# Patient Record
Sex: MALE | Race: WHITE | NOT HISPANIC OR LATINO | Employment: UNEMPLOYED | URBAN - METROPOLITAN AREA
[De-identification: names, ages, dates, MRNs, and addresses within clinical notes are randomized per-mention and may not be internally consistent; named-entity substitution may affect disease eponyms.]

---

## 2019-11-04 ENCOUNTER — TELEPHONE (OUTPATIENT)
Dept: SCHEDULING | Facility: IMAGING CENTER | Age: 64
End: 2019-11-04

## 2019-11-11 RX ORDER — TRAMADOL HYDROCHLORIDE 50 MG/1
TABLET ORAL
Refills: 1 | COMMUNITY
Start: 2019-10-17 | End: 2023-11-08

## 2019-11-15 ENCOUNTER — OFFICE VISIT (OUTPATIENT)
Dept: MEDICAL GROUP | Age: 64
End: 2019-11-15
Payer: MEDICARE

## 2019-11-15 VITALS
SYSTOLIC BLOOD PRESSURE: 110 MMHG | WEIGHT: 149.6 LBS | HEART RATE: 87 BPM | OXYGEN SATURATION: 96 % | DIASTOLIC BLOOD PRESSURE: 60 MMHG | HEIGHT: 69 IN | TEMPERATURE: 98.6 F | BODY MASS INDEX: 22.16 KG/M2

## 2019-11-15 DIAGNOSIS — M54.12 CERVICAL RADICULAR PAIN: ICD-10-CM

## 2019-11-15 DIAGNOSIS — Z98.890 S/P CERVICAL DISCECTOMY: ICD-10-CM

## 2019-11-15 DIAGNOSIS — Z23 NEED FOR VACCINATION: ICD-10-CM

## 2019-11-15 DIAGNOSIS — I10 ESSENTIAL HYPERTENSION: ICD-10-CM

## 2019-11-15 DIAGNOSIS — Z79.891 CHRONIC USE OF OPIATE DRUG FOR THERAPEUTIC PURPOSE: ICD-10-CM

## 2019-11-15 DIAGNOSIS — M54.16 LUMBAR RADICULOPATHY: ICD-10-CM

## 2019-11-15 DIAGNOSIS — Z76.89 ESTABLISHING CARE WITH NEW DOCTOR, ENCOUNTER FOR: ICD-10-CM

## 2019-11-15 PROCEDURE — 90471 IMMUNIZATION ADMIN: CPT | Performed by: PHYSICIAN ASSISTANT

## 2019-11-15 PROCEDURE — 99204 OFFICE O/P NEW MOD 45 MIN: CPT | Mod: 25 | Performed by: PHYSICIAN ASSISTANT

## 2019-11-15 PROCEDURE — 90715 TDAP VACCINE 7 YRS/> IM: CPT | Performed by: PHYSICIAN ASSISTANT

## 2019-11-15 RX ORDER — AMLODIPINE BESYLATE 5 MG/1
5 TABLET ORAL DAILY
COMMUNITY
End: 2019-11-15 | Stop reason: SDUPTHER

## 2019-11-15 RX ORDER — GABAPENTIN 100 MG/1
100 CAPSULE ORAL EVERY 12 HOURS
Qty: 180 CAP | Refills: 0 | Status: SHIPPED | OUTPATIENT
Start: 2019-11-15 | End: 2020-02-13

## 2019-11-15 RX ORDER — AMLODIPINE BESYLATE 5 MG/1
5 TABLET ORAL DAILY
Qty: 90 TAB | Refills: 3 | Status: SHIPPED | OUTPATIENT
Start: 2019-11-15 | End: 2020-10-23

## 2019-11-15 RX ORDER — LOSARTAN POTASSIUM 100 MG/1
100 TABLET ORAL DAILY
Qty: 90 TAB | Refills: 3 | Status: SHIPPED | OUTPATIENT
Start: 2019-11-15 | End: 2020-11-23

## 2019-11-15 RX ORDER — LOSARTAN POTASSIUM 100 MG/1
100 TABLET ORAL DAILY
COMMUNITY
End: 2019-11-15 | Stop reason: SDUPTHER

## 2019-11-15 RX ORDER — GABAPENTIN 100 MG/1
100 CAPSULE ORAL EVERY 12 HOURS
COMMUNITY
End: 2019-11-15 | Stop reason: SDUPTHER

## 2019-11-15 SDOH — HEALTH STABILITY: MENTAL HEALTH: HOW OFTEN DO YOU HAVE A DRINK CONTAINING ALCOHOL?: MONTHLY OR LESS

## 2019-11-15 SDOH — HEALTH STABILITY: MENTAL HEALTH: HOW MANY STANDARD DRINKS CONTAINING ALCOHOL DO YOU HAVE ON A TYPICAL DAY?: 1 OR 2

## 2019-11-15 ASSESSMENT — PATIENT HEALTH QUESTIONNAIRE - PHQ9: CLINICAL INTERPRETATION OF PHQ2 SCORE: 0

## 2019-11-15 NOTE — PROGRESS NOTES
cc: Establish care and medication refill    Subjective:     NICKOLAS Allred is a 64 y.o. male presenting to [facility].  He moved to the area few months ago from [place].  He is retired and is helping take care of his grandson-and plans to help take care of his second grandchild which is due in about 5 months.  He will be switching insurances at the end of the year, and is unsure if his new insurance will be accepted here at this clinic.  He is in need of medication refills.    Essential hypertension  He has been well controlled on 5 mg of amlodipine and 100 mg of losartan.  He does not monitor his blood pressures.  He does need a refill today.  Denies dizziness, chest pain, palpitations, shortness of breath, lower extremity edema.    Cervical radicular pain  Per patient he had cervical laminectomy in approximately 2010.  He still continues to have moderate cervical pain, with left-sided radiculopathy.  He was previously followed by neurosurgery and physical therapy.  Was discharged, as this was a Workmen's Comp. case.  He does take 100 mg of gabapentin twice daily, which does help with the radiculopathy.  He also is taking 50 mg of tramadol.  States it is prescribed up to 4 times a day, rarely does he take it 4 times a day, usually it is 2-3 times a day.  This also does help with his cervical pain.  He also has continued arthritis from previous shoulder surgeries which does help relieve his pain.  He does not need a refill today.    Lumbar radiculopathy  Per patient he has a bulging disc at L5-S1.  At some point they were planning on surgery, unsure when this was.  He has had  2 epidural steroid injections, which she did get some good pain relief with.  He is unsure when his last steroid injection was.  Is been a few years.  He does continue to have lower back pain, with occasionally numbness and tingling.  No loss of bowel or bladder function      Review of systems:  See above.       Current Outpatient  "Medications:   •  gabapentin (NEURONTIN) 100 MG Cap, Take 1 Cap by mouth every 12 hours for 90 days., Disp: 180 Cap, Rfl: 0  •  losartan (COZAAR) 100 MG Tab, Take 1 Tab by mouth every day for 90 days., Disp: 90 Tab, Rfl: 3  •  amLODIPine (NORVASC) 5 MG Tab, Take 1 Tab by mouth every day for 90 days., Disp: 90 Tab, Rfl: 3  •  tramadol (ULTRAM) 50 MG Tab, TK 4 TS PO QD PRN FOR 30 DAYS, Disp: , Rfl: 1    Allergies, past medical history, past surgical history, family history, social history reviewed and updated    Objective:     Vitals: /60 (BP Location: Left arm, Patient Position: Sitting, BP Cuff Size: Adult)   Pulse 87   Temp 37 °C (98.6 °F) (Temporal)   Ht 1.753 m (5' 9\")   Wt 67.9 kg (149 lb 9.6 oz)   SpO2 96%   BMI 22.09 kg/m²   General: Alert, pleasant, NAD  HEENT: Normocephalic. Neck supple.  No thyromegaly or masses palpated. No cervical or supraclavicular lymphadenopathy. No carotid bruits   Heart: Regular rate and rhythm.  S1 and S2 normal.  No murmurs appreciated.  Respiratory: Normal respiratory effort.  Clear to auscultation bilaterally.  Skin: Warm, dry, no rashes.  Extremities: No leg edema.  Radial pulses 2+ symmetric  Psych:  Affect/mood is normal, judgement is good, memory is intact, grooming is appropriate.    Assessment/Plan:     Jim was seen today for establish care and medication refill.    Diagnoses and all orders for this visit:    Essential hypertension  -Controlled.  Continue current medications.  Advised intermittent BP checks.  -     losartan (COZAAR) 100 MG Tab; Take 1 Tab by mouth every day for 90 days.  -     amLODIPine (NORVASC) 5 MG Tab; Take 1 Tab by mouth every day for 90 days.    Cervical radicular pain  -Discussed with patient that I do not do chronic pain management.  He does not need a refill of his tramadol, however if he runs out of his prescription will bridge him until he is seen by a pain management.  Referral sent.  Continue with gabapentin  -     REFERRAL " TO PAIN CLINIC  -     gabapentin (NEURONTIN) 100 MG Cap; Take 1 Cap by mouth every 12 hours for 90 days.    S/P cervical discectomy  -We will request old records and review when received  -     REFERRAL TO PAIN CLINIC    Lumbar radiculopathy  -We will request old records.  May consider MRI or referral to neurosurgery pending last imaging studies.  Patient states that if his new insurance is accepted here and he will plan on following up and we can evaluate this further.  The gabapentin works well to help control his radiculopathy.  Medication sent to pharmacy.  -     REFERRAL TO PAIN CLINIC  -     gabapentin (NEURONTIN) 100 MG Cap; Take 1 Cap by mouth every 12 hours for 90 days.    Chronic use of opiate drug for therapeutic purpose  -     REFERRAL TO PAIN CLINIC    Need for vaccination  -Immunization given in clinic today.  -     Tdap Vaccine =>6YO IM    Establishing care with new doctor, encounter for  -Discussed regular lab work, however he declines at this time.  He states that if his new insurance is accepted here and he will follow-up and we will plan on ordering lab work and evaluating his chronic conditions further.  We will request old records and review when received    Patient was seen for 40 minutes face to face of which > 50% of appointment time was spent on counseling and coordination of care regarding the above.    Return in about 3 months (around 2/15/2020) for Medication Check.

## 2019-11-15 NOTE — ASSESSMENT & PLAN NOTE
Is been well controlled on 5 mg of amlodipine and 100 mg of losartan.  He does not monitor his blood pressures.  He does need a refill today.  Denies dizziness, chest pain, palpitations, shortness of breath, lower extremity edema.

## 2019-11-16 NOTE — ASSESSMENT & PLAN NOTE
Per patient he had cervical laminectomy in approximately 2010.  He still continues to have moderate cervical pain, with left-sided radiculopathy.  He was previously followed by neurosurgery and physical therapy.  Was discharged, as this was a Workmen's Comp. case.  He does take 100 mg of gabapentin twice daily, which does help with the radiculopathy.  He also is taking 50 mg of tramadol.  States it is prescribed up to 4 times a day, rarely does he take it 4 times a day, usually it is 2-3 times a day.  This also does help with his cervical pain.  He also has continued arthritis from previous shoulder surgeries which does help relieve his pain.  He does not need a refill today.

## 2019-11-16 NOTE — ASSESSMENT & PLAN NOTE
Per patient he has a bulging disc at L5-S1.  At some point they were planning on surgery, unsure when this was.  He has had  2 epidural steroid injections, which she did get some good pain relief with.  He is unsure when his last steroid injection was.  Is been a few years.  He does continue to have lower back pain, with occasionally numbness and tingling.  No loss of bowel or bladder function

## 2019-11-19 ENCOUNTER — TELEPHONE (OUTPATIENT)
Dept: MEDICAL GROUP | Age: 64
End: 2019-11-19

## 2019-11-19 DIAGNOSIS — M54.16 LUMBAR RADICULOPATHY: ICD-10-CM

## 2019-11-19 DIAGNOSIS — M54.12 CERVICAL RADICULAR PAIN: ICD-10-CM

## 2019-11-19 DIAGNOSIS — Z98.890 S/P CERVICAL DISCECTOMY: ICD-10-CM

## 2019-11-19 RX ORDER — LIDOCAINE 50 MG/G
1 PATCH TOPICAL EVERY 24 HOURS
Qty: 30 PATCH | Refills: 1 | Status: SHIPPED | OUTPATIENT
Start: 2019-11-19 | End: 2019-12-19

## 2019-11-19 NOTE — TELEPHONE ENCOUNTER
VOICEMAIL  1. Caller Name: Jmi Fallon                        Call Back Number: [phone] (home)       2. Message: Pt left vm stating that he forgot to mention during his office visit that he was also using lidocaine patches before and needs a refill. No medication on file. Please advise.    3. Patient approves office to leave a detailed voicemail/MyChart message: yes

## 2019-11-19 NOTE — TELEPHONE ENCOUNTER
Phone Number Called: [phone] (home)       Call outcome: spoke to patient regarding message below    Message: Spoke to pt and he stated that he uses it when he needs them on his back right hip shoulder and neck, pt wants a box of 30. Pt stated prior pcp gave him a box of 30 that will last around 5-6 weeks at a time, pt does not know dose on them. Please advise.

## 2019-11-19 NOTE — TELEPHONE ENCOUNTER
We can fill it for him.  But can we find out how many times a week he was using the lidocaine patches

## 2019-11-20 NOTE — TELEPHONE ENCOUNTER
Phone Number Called: [phone] (home)       Call outcome: spoke to patient regarding message below    Message: Spoke to pt and informed that medication was sent to the pharmacy on file.

## 2020-01-10 ENCOUNTER — TELEPHONE (OUTPATIENT)
Dept: MEDICAL GROUP | Age: 65
End: 2020-01-10

## 2020-01-11 NOTE — TELEPHONE ENCOUNTER
DOCUMENTATION OF PAR STATUS:    1. Name of Medication & Dose: lidocaine (LIDODERM) 5 % Patch     2. Name of Prescription Coverage Company & phone #: [place]    3. Date Prior Auth Submitted: 1-    4. What information was given to obtain insurance decision? Diagnosis codes, pt info, insurance    5. Prior Auth Status? Pending     Key: [id number]    6. Patient Notified: no

## 2020-01-13 NOTE — TELEPHONE ENCOUNTER
FINAL PRIOR AUTHORIZATION STATUS:    1.  Name of Medication & Dose: Lidoderm 5% patch     2. Prior Auth Status: Denied.  Reason: Denied due to inappropriate diagnoses.     3. Action Taken: Pharmacy Notified: yes Patient Notified: yes

## 2020-02-05 ENCOUNTER — TELEPHONE (OUTPATIENT)
Dept: MEDICAL GROUP | Age: 65
End: 2020-02-05

## 2020-02-05 NOTE — TELEPHONE ENCOUNTER
ESTABLISHED PATIENT PRE-VISIT PLANNING     Patient was NOT contacted to complete PVP.     Note: Patient will not be contacted if there is no indication to call.     1.  Reviewed notes from the last few office visits within the medical group: Yes    2.  If any orders were placed at last visit or intended to be done for this visit (i.e. 6 mos follow-up), do we have Results/Consult Notes?        •  Labs - Labs were not ordered at last office visit.   Note: If patient appointment is for lab review and patient did not complete labs, check with provider if OK to reschedule patient until labs completed.       •  Imaging - Imaging was not ordered at last office visit.       •  Referrals - Referral ordered, patient was seen and consult notes are in chart. Care Teams updated  YES.    3. Is this appointment scheduled as a Hospital Follow-Up? No    4.  Immunizations were updated in Epic using WebIZ?: Epic matches WebIZ       •  Web Iz Recommendations: PREVNAR (PCV13)  and SHINGRIX (Shingles)    5.  Patient is due for the following Health Maintenance Topics:   Health Maintenance Due   Topic Date Due   • URINE DRUG SCREEN  1955   • Annual Wellness Visit  1955   • HEPATITIS C SCREENING  1955   • COLONOSCOPY  03/23/2003   • IMM ZOSTER VACCINES (1 of 2) 03/23/2003   • IMM PNEUMOCOCCAL VACCINE: 65+ Years (1 of 2 - PCV13) 03/23/2018         6. Orders for overdue Health Maintenance topics pended in Pre-Charting? N\A    7.  AHA (MDX) form printed for Provider? YES    8.  Patient was NOT informed to arrive 15 min prior to their scheduled appointment and bring in their medication bottles.

## 2020-10-22 DIAGNOSIS — I10 ESSENTIAL HYPERTENSION: ICD-10-CM

## 2020-10-23 RX ORDER — AMLODIPINE BESYLATE 5 MG/1
TABLET ORAL
Qty: 90 TAB | Refills: 0 | Status: SHIPPED | OUTPATIENT
Start: 2020-10-23 | End: 2022-07-27 | Stop reason: SDUPTHER

## 2020-10-23 NOTE — TELEPHONE ENCOUNTER
Phone Number Called: [phone] (home)     Call outcome: Spoke to patient regarding message below.    Message: Called patient to schedule AWV. Patient was confused with who Nereyda is. I explained that she is his primary care physician. He asked if she was out recently on maternity leave. I confirmed she was. He said that he got a new primary care because it was hard to get his medication while she was on maternity leave. He is going to stick with his current PCP.

## 2020-11-21 DIAGNOSIS — I10 ESSENTIAL HYPERTENSION: ICD-10-CM

## 2020-11-23 RX ORDER — LOSARTAN POTASSIUM 100 MG/1
TABLET ORAL
Qty: 90 TAB | Refills: 0 | Status: SHIPPED | OUTPATIENT
Start: 2020-11-23 | End: 2022-07-27 | Stop reason: SDUPTHER

## 2020-11-23 NOTE — TELEPHONE ENCOUNTER
Phone Number Called: [phone] (home)       Call outcome: Spoke to patient regarding message below.    Message: Spoke to pt and he informed me that he no longer see's Nereyda for primary care and he does not want to get anything filled by us. He has a new pcp and does not know why we are still getting prescriptions sent to us.

## 2020-11-23 NOTE — TELEPHONE ENCOUNTER
Received request via: Pharmacy    Was the patient seen in the last year in this department? No  Last seen 11/15/2019    Does the patient have an active prescription (recently filled or refills available) for medication(s) requested? No

## 2020-12-28 ENCOUNTER — HOSPITAL ENCOUNTER (OUTPATIENT)
Dept: HOSPITAL 8 - CFH | Age: 65
Discharge: HOME | End: 2020-12-28
Payer: MEDICARE

## 2020-12-28 DIAGNOSIS — Z87.891: ICD-10-CM

## 2020-12-28 DIAGNOSIS — Z12.2: Primary | ICD-10-CM

## 2020-12-28 PROCEDURE — G0297 LDCT FOR LUNG CA SCREEN: HCPCS

## 2021-03-03 DIAGNOSIS — Z23 NEED FOR VACCINATION: ICD-10-CM

## 2022-01-21 ENCOUNTER — TELEPHONE (OUTPATIENT)
Dept: MEDICAL GROUP | Facility: CLINIC | Age: 67
End: 2022-01-21

## 2022-01-21 DIAGNOSIS — M72.2 PLANTAR FASCIITIS: ICD-10-CM

## 2022-01-22 NOTE — TELEPHONE ENCOUNTER
VOICEMAIL  1. Caller Name: Jim                        Call Back Number: [phone]    2. Message: Jim was told by Dustin to reach out to PCP for a prescription for orthotics for plantar fasciitis. He's wondering if he can get a prescription or does he need to make an appointment?    3. Patient approves office to leave a detailed voicemail/MyChart message: N\A

## 2022-06-24 ENCOUNTER — OFFICE VISIT (OUTPATIENT)
Dept: MEDICAL GROUP | Facility: CLINIC | Age: 67
End: 2022-06-24
Payer: OTHER MISCELLANEOUS

## 2022-06-24 VITALS
TEMPERATURE: 97.3 F | HEART RATE: 85 BPM | OXYGEN SATURATION: 94 % | SYSTOLIC BLOOD PRESSURE: 125 MMHG | RESPIRATION RATE: 16 BRPM | HEIGHT: 70 IN | DIASTOLIC BLOOD PRESSURE: 72 MMHG | BODY MASS INDEX: 20.19 KG/M2 | WEIGHT: 141 LBS

## 2022-06-24 DIAGNOSIS — K40.90 INGUINAL HERNIA WITHOUT OBSTRUCTION OR GANGRENE, RECURRENCE NOT SPECIFIED, UNSPECIFIED LATERALITY: ICD-10-CM

## 2022-06-24 PROCEDURE — 99213 OFFICE O/P EST LOW 20 MIN: CPT | Mod: GE | Performed by: STUDENT IN AN ORGANIZED HEALTH CARE EDUCATION/TRAINING PROGRAM

## 2022-06-24 NOTE — PROGRESS NOTES
[facility] MEDICINE     PATIENT ID:  NAME:  Jim Allred  MRN:               [id number]  YOB: 1955    Resident: Eamon Rousseau DO    CC:  inguineal pain      HPI: Jim Allred is a 67 y.o. male who presented with inguinal pain    Problem   Inguinal Hernia    - patient w/ left inguineal pain for several months  - patient w/ pain ongoing which is worsening  - patient notes he has a bulge and then it will periodically get stuck in place but he is able to reduce it  - pain is typically sharp            REVIEW OF SYSTEMS:   Ten systems reviewed and were negative except as noted in the HPI.                PROBLEM LIST  Patient Active Problem List   Diagnosis   • Chronic use of opiate drug for therapeutic purpose   • Cervical radicular pain   • S/P cervical discectomy   • Lumbar radiculopathy   • Need for vaccination   • Essential hypertension   • Inguinal hernia        PAST SURGICAL HISTORY:  Past Surgical History:   Procedure Laterality Date   • DUPUYTREN CONTRACTURE RELEASE Left 2016   • CERVICAL LAMINECTOMY POSTERIOR  2010   • ORIF, HIP     • SHOULDER ARTHROPLASTY TOTAL Left    • SHOULDER ARTHROSCOPY W/ SLAP / LABRAL REPAIR         FAMILY HISTORY:  Family History   Problem Relation Age of Onset   • No Known Problems Daughter        SOCIAL HISTORY:   Social History     Tobacco Use   • Smoking status: Former Smoker     Packs/day: 0.00   • Smokeless tobacco: Never Used   Substance Use Topics   • Alcohol use: Yes     Comment: beer       ALLERGIES:  Allergies   Allergen Reactions   • Penicillins        OUTPATIENT MEDICATIONS:    Current Outpatient Medications:   •  Elastic Bandages & Supports (PLANTAR FASCIITIS SUPPORT) Misc, 1 Units every day. Foot orthotic for plantar fasciitis, Disp: 1 Each, Rfl: 1  •  losartan (COZAAR) 100 MG Tab, TAKE 1 TABLET BY MOUTH EVERY DAY, Disp: 90 Tab, Rfl: 0  •  amLODIPine (NORVASC) 5 MG Tab, TAKE 1 TABLET BY MOUTH EVERY DAY, Disp: 90 Tab, Rfl: 0  •  tramadol (ULTRAM) 50  "MG Tab, TK 4 TS PO QD PRN FOR 30 DAYS, Disp: , Rfl: 1    PHYSICAL EXAM:  Vitals:    06/24/22 1121   BP: 125/72   BP Location: Right arm   Patient Position: Sitting   BP Cuff Size: Adult   Pulse: 85   Resp: 16   Temp: 36.3 °C (97.3 °F)   TempSrc: Temporal   SpO2: 94%   Weight: 64 kg (141 lb)   Height: 1.778 m (5' 10\")       General: Pt resting in NAD, cooperative   Skin:  Pink, warm and dry.  HEENT: NC/AT. EOMI.  Abdomen:  Abdomen is soft, nontender. Left inguinal canal: small bulge present w/ cough, small palable lump over inguinal ligament.  Extremities:  Full range of motion.  CNS:  Muscle tone is normal. No gross focal neurologic deficits      ASSESSMENT/PLAN:   67 y.o. male     Problem List Items Addressed This Visit     Inguinal hernia     - patient w/ left inguineal pain for several months  - patient w/ pain ongoing which is worsening  - patient notes he has a bulge and then it will periodically get stuck in place but he is able to reduce it  - pain is typically sharp       - small bulge left inguinal area   - possible right inguinal hernia as well  - referral to gen surgery   - instructed patient to go to the ED if he is unable to reduce the hernia           Relevant Orders    Referral to General Surgery          Eamon Rousseau, DO  PGY-3  [facility]   "

## 2022-06-24 NOTE — ASSESSMENT & PLAN NOTE
- patient w/ left inguineal pain for several months  - patient w/ pain ongoing which is worsening  - patient notes he has a bulge and then it will periodically get stuck in place but he is able to reduce it  - pain is typically sharp       - small bulge left inguinal area   - possible right inguinal hernia as well  - referral to gen surgery   - instructed patient to go to the ED if he is unable to reduce the hernia

## 2022-07-27 DIAGNOSIS — I10 ESSENTIAL HYPERTENSION: ICD-10-CM

## 2022-08-04 RX ORDER — LOSARTAN POTASSIUM 100 MG/1
100 TABLET ORAL
Qty: 90 TABLET | Refills: 3 | Status: SHIPPED | OUTPATIENT
Start: 2022-08-04 | End: 2023-08-16 | Stop reason: SDUPTHER

## 2022-08-04 RX ORDER — AMLODIPINE BESYLATE 5 MG/1
5 TABLET ORAL
Qty: 90 TABLET | Refills: 3 | Status: SHIPPED | OUTPATIENT
Start: 2022-08-04 | End: 2023-08-16 | Stop reason: SDUPTHER

## 2022-08-04 NOTE — TELEPHONE ENCOUNTER
Pt called again requesting refills below. Pt almost out of medication, Dr. Kraus has not yet seen the refill request. Could you please send in? Thank you!

## 2023-08-10 ENCOUNTER — OFFICE VISIT (OUTPATIENT)
Dept: MEDICAL GROUP | Facility: CLINIC | Age: 68
End: 2023-08-10
Payer: COMMERCIAL

## 2023-08-10 VITALS
DIASTOLIC BLOOD PRESSURE: 66 MMHG | SYSTOLIC BLOOD PRESSURE: 99 MMHG | TEMPERATURE: 98.2 F | WEIGHT: 144.44 LBS | HEIGHT: 70 IN | OXYGEN SATURATION: 95 % | BODY MASS INDEX: 20.68 KG/M2 | HEART RATE: 88 BPM

## 2023-08-10 DIAGNOSIS — I10 ESSENTIAL HYPERTENSION: ICD-10-CM

## 2023-08-10 DIAGNOSIS — M54.12 CERVICAL RADICULAR PAIN: ICD-10-CM

## 2023-08-10 PROCEDURE — 99213 OFFICE O/P EST LOW 20 MIN: CPT | Mod: GE

## 2023-08-10 PROCEDURE — 3074F SYST BP LT 130 MM HG: CPT

## 2023-08-10 PROCEDURE — 3078F DIAST BP <80 MM HG: CPT

## 2023-08-10 RX ORDER — TRAMADOL HYDROCHLORIDE 50 MG/1
TABLET ORAL
COMMUNITY
End: 2023-11-08

## 2023-08-10 RX ORDER — LIDOCAINE 50 MG/G
1 PATCH TOPICAL
Qty: 30 PATCH | Refills: 2 | Status: SHIPPED | OUTPATIENT
Start: 2023-08-10 | End: 2023-11-08 | Stop reason: SDUPTHER

## 2023-08-10 RX ORDER — TAMSULOSIN HYDROCHLORIDE 0.4 MG/1
0.4 CAPSULE ORAL
COMMUNITY

## 2023-08-10 RX ORDER — FINASTERIDE 5 MG/1
5 TABLET, FILM COATED ORAL DAILY
COMMUNITY

## 2023-08-10 RX ORDER — GABAPENTIN 100 MG/1
CAPSULE ORAL
COMMUNITY
Start: 2023-07-17 | End: 2023-11-08

## 2023-08-10 RX ORDER — LOSARTAN POTASSIUM 100 MG/1
TABLET ORAL
COMMUNITY
End: 2023-11-08

## 2023-08-10 RX ORDER — AMLODIPINE BESYLATE 5 MG/1
TABLET ORAL
COMMUNITY
End: 2023-11-08

## 2023-08-10 RX ORDER — BACLOFEN 10 MG/1
TABLET ORAL
COMMUNITY
Start: 2023-06-21 | End: 2023-11-08

## 2023-08-10 ASSESSMENT — PATIENT HEALTH QUESTIONNAIRE - PHQ9: CLINICAL INTERPRETATION OF PHQ2 SCORE: 0

## 2023-08-10 NOTE — PROGRESS NOTES
SUBJECTIVE:     CC:  to establish care    HISTORY OF THE PRESENT ILLNESS:   Patient is a 68 y.o. male, here today requesting his refill for lidocaine patches for his shoulder and lower back pain. Patient reports his pain was managed by his former provider, Dr. Jain, however, he and his insurance have been unable to reach Dr. Jain for refill. Patient states he has had neck surgery, two shoulder surgeries, bilaterally and [facility]. Patient also had a left inguinal hernia repair 2022.  Patient states he takes Gabapentin and baclofen daily. He uses the lidocaine patch as needed.    #Essential Hypertension:   His blood pressure is controlled. Patient take Amlodipine 5 mg and Losartan 100mg daily.     Patient Active Problem List   Diagnosis    Chronic use of opiate drug for therapeutic purpose    Cervical radicular pain    S/P cervical discectomy    Lumbar radiculopathy    Need for vaccination    Essential hypertension    Inguinal hernia     Past Surgical History:   Procedure Laterality Date    DUPUYTREN CONTRACTURE RELEASE Left 2016    CERVICAL LAMINECTOMY POSTERIOR  2010    ORIF, HIP      SHOULDER ARTHROPLASTY TOTAL Left     SHOULDER ARTHROSCOPY W/ SLAP / LABRAL REPAIR       Family History   Problem Relation Age of Onset    No Known Problems Daughter      Social History     Tobacco Use    Smoking status: Every Day     Packs/day: 0.00     Types: Cigarettes    Smokeless tobacco: Never   Vaping Use    Vaping Use: Never used   Substance Use Topics    Alcohol use: Yes     Comment: beer daily    Drug use: Never     Current Outpatient Medications on File Prior to Visit   Medication Sig Dispense Refill    baclofen (LIORESAL) 10 MG Tab       gabapentin (NEURONTIN) 100 MG Cap       finasteride (PROSCAR) 5 MG Tab Take 5 mg by mouth every day.      tamsulosin (FLOMAX) 0.4 MG capsule Take 0.4 mg by mouth 1/2 hour after breakfast.      amLODIPine (NORVASC) 5 MG Tab Take 1  "Tablet by mouth every day. 90 Tablet 3    losartan (COZAAR) 100 MG Tab Take 1 Tablet by mouth every day. 90 Tablet 3    tramadol (ULTRAM) 50 MG Tab TK 4 TS PO QD PRN FOR 30 DAYS  1    amLODIPine (NORVASC) 5 MG Tab AMLODIPINE BESYLATE 5 MG TABS (Patient not taking: Reported on 8/10/2023)      losartan (COZAAR) 100 MG Tab LOSARTAN POTASSIUM 100 MG TABS (Patient not taking: Reported on 8/10/2023)      traMADol (ULTRAM) 50 MG Tab TRAMADOL HCL 50 MG TABS (Patient not taking: Reported on 8/10/2023)      Elastic Bandages & Supports (PLANTAR FASCIITIS SUPPORT) Misc 1 Units every day. Foot orthotic for plantar fasciitis (Patient not taking: Reported on 8/10/2023) 1 Each 1     No current facility-administered medications on file prior to visit.       Allergies   Allergen Reactions    Penicillins        ROS:   Gen: no fevers/chills, no changes in weight  Eyes: no changes in vision  ENT: no changes in hearing  Pulm: no sob, no cough  CV: no chest pain, no palpitations  GI: no nausea/vomiting, no diarrhea  MSk: no myalgias  Skin: no rash  Neuro: no headaches, no numbness/tingling      OBJECTIVE:     Exam: BP 99/66 (BP Location: Left arm, Patient Position: Sitting)   Pulse 88   Temp 36.8 °C (98.2 °F) (Temporal)   Ht 1.778 m (5' 10\")   Wt 65.5 kg (144 lb 7 oz)   SpO2 95%  Body mass index is 20.72 kg/m².    General: Normal appearing. No distress.  HEENT: Normocephalic. Atraumatic.  Neck: Supple without JVD or bruit. Thyroid is not enlarged.  Pulmonary: Clear to ausculation.  Normal effort. No rales, ronchi, or wheezing.  Cardiovascular: Regular rate and rhythm without murmur. Carotid and radial pulses are intact and equal bilaterally.  Abdomen: Soft, nontender, nondistended. Normal bowel sounds. Liver and spleen are not palpable  Neurologic: Grossly nonfocal  Skin: Warm and dry.  No obvious lesions.  Musculoskeletal: Normal gait. No extremity cyanosis, clubbing, or edema.  Psych: Normal mood and affect. Alert and oriented x3. " Judgment and insight is normal.      ASSESSMENT & PLAN:   68 y.o. male with the following -    #Cervical radicular pain  Patient presents today requesting his refill for lidocaine patches for his shoulder and lower back pain. Patient's pain was managed by his former provider, Dr. Jain, however, he and his insurance have been unable to reach Dr. Jain for refill. Patient is s/p neck surgery, two shoulder surgeries, bilaterally and [facility]. Patient takes Gabapentin and baclofen daily. He uses the lidocaine patch as needed on his shoulders and lower for pain.    - Refill sent for Lidocaine 5% patch as needed for pain.  - Insurance form completed for lidocaine patch.    #Essential Hypertension   His blood pressure is controlled. Patient take Amlodipine 5 mg and Losartan 100mg daily.  - Continue Amlodipine 5mg daily  - Continue Losartan 100mg daily       Nicolas Moralez, PGY-2  [facility]

## 2023-08-16 DIAGNOSIS — I10 ESSENTIAL HYPERTENSION: ICD-10-CM

## 2023-08-17 RX ORDER — LOSARTAN POTASSIUM 100 MG/1
100 TABLET ORAL
Qty: 90 TABLET | Refills: 3 | Status: SHIPPED | OUTPATIENT
Start: 2023-08-17 | End: 2023-11-08 | Stop reason: SDUPTHER

## 2023-08-17 RX ORDER — AMLODIPINE BESYLATE 5 MG/1
5 TABLET ORAL
Qty: 90 TABLET | Refills: 3 | Status: SHIPPED | OUTPATIENT
Start: 2023-08-17 | End: 2023-11-08 | Stop reason: SDUPTHER

## 2023-09-06 ENCOUNTER — TELEPHONE (OUTPATIENT)
Dept: MEDICAL GROUP | Facility: OTHER | Age: 68
End: 2023-09-06

## 2023-09-06 DIAGNOSIS — M54.12 CERVICAL RADICULAR PAIN: ICD-10-CM

## 2023-09-06 NOTE — TELEPHONE ENCOUNTER
[phone]  5% lidocaine gel/ointment/cream [facility]  Patient denied lidocaine patch by insurance

## 2023-09-14 ENCOUNTER — TELEPHONE (OUTPATIENT)
Dept: MEDICAL GROUP | Facility: CLINIC | Age: 68
End: 2023-09-14

## 2023-09-14 NOTE — LETTER
September 27, 2023    Appeal Letter for Lidocaine 5% Patch under medicare Part D    To Whom It May Concern:    On behalf of my patient, Jim Allred, Member number [id number]. I will like to file an appeal for coverage of his Lidocaine 5% patch, which was denied on 9/6/203.    Mr Allred has a history of  chronic shoulder and lower back pain. His pain was controlled in the past with the above mentioned patch, however, his former provider was not reacheable for refills. Mr Allred is currently in my care. He has had neck surgery, two shoulder surgeries, bilaterally and [facility]. He takes Gabapentin and Baclofen daily. He uses the lidocaine patch as needed.     We have tried over the counter 1% Lidocaine patch, which has not provided relief.    Thank you for your consideration of this appeal.     If you have any questions please do not hesitate to call me at the phone number listed below.    Sincerely,          Yuliana Moralez M.D.  [phone]

## 2023-09-14 NOTE — TELEPHONE ENCOUNTER
Patient called requesting an appeal for the Lidocaine patch to be requested by you, I have the denial letter, please advise thank you

## 2023-10-13 ENCOUNTER — TELEPHONE (OUTPATIENT)
Dept: MEDICAL GROUP | Facility: CLINIC | Age: 68
End: 2023-10-13
Payer: COMMERCIAL

## 2023-10-14 NOTE — TELEPHONE ENCOUNTER
Patient called regarding Lidocaine patch prior auth denial, he would like to know what you advise as to what the next step is,please advise thank you

## 2023-10-16 NOTE — TELEPHONE ENCOUNTER
That appeal was denied Dr. Moralez, he is reaching out to see if there is any other advise on how to move forward with this

## 2023-11-08 ENCOUNTER — OFFICE VISIT (OUTPATIENT)
Dept: INTERNAL MEDICINE | Facility: OTHER | Age: 68
End: 2023-11-08
Payer: COMMERCIAL

## 2023-11-08 VITALS
WEIGHT: 149.8 LBS | DIASTOLIC BLOOD PRESSURE: 81 MMHG | HEART RATE: 94 BPM | HEIGHT: 70 IN | SYSTOLIC BLOOD PRESSURE: 134 MMHG | BODY MASS INDEX: 21.45 KG/M2 | OXYGEN SATURATION: 99 % | TEMPERATURE: 97.4 F

## 2023-11-08 DIAGNOSIS — R55 SYNCOPE AND COLLAPSE: ICD-10-CM

## 2023-11-08 DIAGNOSIS — R35.1 BENIGN PROSTATIC HYPERPLASIA WITH NOCTURIA: ICD-10-CM

## 2023-11-08 DIAGNOSIS — M25.511 PAIN OF BOTH SHOULDER JOINTS: ICD-10-CM

## 2023-11-08 DIAGNOSIS — Z96.641 PRESENCE OF RIGHT ARTIFICIAL HIP JOINT: ICD-10-CM

## 2023-11-08 DIAGNOSIS — M25.512 PAIN OF BOTH SHOULDER JOINTS: ICD-10-CM

## 2023-11-08 DIAGNOSIS — Z86.39 HISTORY OF IRON DEFICIENCY: ICD-10-CM

## 2023-11-08 DIAGNOSIS — N40.1 BENIGN PROSTATIC HYPERPLASIA WITH NOCTURIA: ICD-10-CM

## 2023-11-08 DIAGNOSIS — M54.50 CHRONIC BILATERAL LOW BACK PAIN WITHOUT SCIATICA: ICD-10-CM

## 2023-11-08 DIAGNOSIS — Z13.228 SCREENING FOR METABOLIC DISORDER: ICD-10-CM

## 2023-11-08 DIAGNOSIS — Z72.0 TOBACCO USE: ICD-10-CM

## 2023-11-08 DIAGNOSIS — M72.2 PLANTAR FASCIAL FIBROMATOSIS: ICD-10-CM

## 2023-11-08 DIAGNOSIS — I10 ESSENTIAL HYPERTENSION: ICD-10-CM

## 2023-11-08 DIAGNOSIS — G89.29 CHRONIC BILATERAL LOW BACK PAIN WITHOUT SCIATICA: ICD-10-CM

## 2023-11-08 DIAGNOSIS — Z98.890 S/P CERVICAL DISCECTOMY: ICD-10-CM

## 2023-11-08 DIAGNOSIS — H90.3 SENSORINEURAL HEARING LOSS, BILATERAL: ICD-10-CM

## 2023-11-08 PROBLEM — H40.013 OPEN ANGLE WITH BORDERLINE FINDINGS, LOW RISK, BILATERAL: Status: ACTIVE | Noted: 2023-11-08

## 2023-11-08 PROBLEM — M54.9 CHRONIC BACK PAIN: Status: ACTIVE | Noted: 2020-02-07

## 2023-11-08 PROBLEM — F41.9 ANXIETY DISORDER, UNSPECIFIED: Status: ACTIVE | Noted: 2023-11-08

## 2023-11-08 PROBLEM — L82.1 SEBORRHEIC KERATOSIS: Status: ACTIVE | Noted: 2023-11-08

## 2023-11-08 PROBLEM — K57.30 DIVERTICULOSIS OF LARGE INTESTINE WITHOUT PERFORATION OR ABSCESS WITHOUT BLEEDING: Status: ACTIVE | Noted: 2023-11-08

## 2023-11-08 PROBLEM — M75.101 ROTATOR CUFF TEAR, RIGHT: Status: ACTIVE | Noted: 2020-07-14

## 2023-11-08 PROBLEM — M81.0 OSTEOPOROSIS: Status: ACTIVE | Noted: 2020-11-16

## 2023-11-08 PROBLEM — K63.5 POLYP OF COLON: Status: ACTIVE | Noted: 2023-11-08

## 2023-11-08 PROBLEM — F17.200 SMOKES TOBACCO DAILY: Status: ACTIVE | Noted: 2020-11-16

## 2023-11-08 PROBLEM — R33.9 RETENTION OF URINE, UNSPECIFIED: Status: ACTIVE | Noted: 2023-11-08

## 2023-11-08 PROBLEM — M85.88 OTHER SPECIFIED DISORDERS OF BONE DENSITY AND STRUCTURE, OTHER SITE: Status: ACTIVE | Noted: 2020-11-19

## 2023-11-08 PROBLEM — Z96.649 PRESENCE OF HIP JOINT PROSTHESIS: Status: ACTIVE | Noted: 2023-11-08

## 2023-11-08 PROCEDURE — 3079F DIAST BP 80-89 MM HG: CPT | Performed by: INTERNAL MEDICINE

## 2023-11-08 PROCEDURE — 99204 OFFICE O/P NEW MOD 45 MIN: CPT | Performed by: INTERNAL MEDICINE

## 2023-11-08 PROCEDURE — 3075F SYST BP GE 130 - 139MM HG: CPT | Performed by: INTERNAL MEDICINE

## 2023-11-08 RX ORDER — LOSARTAN POTASSIUM 100 MG/1
100 TABLET ORAL
Qty: 90 TABLET | Refills: 2 | Status: SHIPPED | OUTPATIENT
Start: 2023-11-08

## 2023-11-08 RX ORDER — AMLODIPINE BESYLATE 5 MG/1
5 TABLET ORAL
Qty: 90 TABLET | Refills: 2 | Status: SHIPPED | OUTPATIENT
Start: 2023-11-08

## 2023-11-08 RX ORDER — LIDOCAINE 50 MG/G
1 PATCH TOPICAL
Qty: 90 PATCH | Refills: 2 | Status: SHIPPED | OUTPATIENT
Start: 2023-11-08 | End: 2023-12-20 | Stop reason: SDUPTHER

## 2023-11-08 NOTE — PROGRESS NOTES
CC:  New patient    HISTORY OF THE PRESENT ILLNESS: Patient is a 68 y.o. male who presents to hospitals care. He has a PMH significant for chronic pain, BPH, anxiety disorder, hypertension, plantar fascial fibromatosis.     Patient with h/o right hip osteoarthritis s/p arthroplasty 11/2022, [facility]  He had cervical laminectomy in 2014 secondary to cervical radiculopathy related to a Workers' Comp. Patient was part of the [facility]. Has also had bilateral shoulder surgeries.  He also reports chronic pain in the bilateral shoulder, lower back pain without sciatica. Has been on Baclofen, Tramadol and Gabapentin. However, weaned himself off medications about 1-2 months ago. He is now only using lidocaine patch which help.    Plantar fasciitis- [facility]. He has custom orthotics shoes.    Patient with BPH, [facility]. He is currently finasteride 5mg and tamulosin 0.8.mg. Reports that his symptoms are better controlled, he is only have to get up once a night to urinate.    Hypertension, diagnosed several years ago. He is on Losartan and amlodipine, tolerating medications well without side effects. Denies any chest pain, dizziness or light-headedness. He does endorse one episode of syncope about 1.5 months ago while weaning off medications. [facility] which [facility]. Had a Ziopatch as well, results unknown.     Health Maintenance:     Screening/Preventative Topics:  Diet: Well-balanced   Exercise: Daily.   Screen for depression: PHQ-2: 0  Substance Use: 1/2 ppd x 50 years, drinks a case of beer a week, no history of withdrawal. No illict drug use      Cancer screening  Colorectal Cancer Screening: Colonoscopy 4-5 months ago, had 7 polyps   Family history of colon cancer- father  Prostate Cancer Screening/PSA: yearly per [facility]    Allergies: Penicillins    Current Outpatient Medications Ordered in Epic   Medication Sig Dispense Refill     amLODIPine (NORVASC) 5 MG Tab Take 1 Tablet by mouth every day. 90 Tablet 2    losartan (COZAAR) 100 MG Tab Take 1 Tablet by mouth every day. 90 Tablet 2    lidocaine (LIDODERM) 5 % Patch Place 1 Patch on the skin 1 time a day as needed (as needed for shoulder and lower back). 90 Patch 2    finasteride (PROSCAR) 5 MG Tab Take 5 mg by mouth every day.      tamsulosin (FLOMAX) 0.4 MG capsule Take 0.4 mg by mouth 1/2 hour after breakfast.      Elastic Bandages & Supports (PLANTAR FASCIITIS SUPPORT) Misc 1 Units every day. Foot orthotic for plantar fasciitis (Patient not taking: Reported on 8/10/2023) 1 Each 1     No current [facility]-ordered facility-administered medications on file.       No past medical history on file.    Past Surgical History:   Procedure Laterality Date    DUPUYTREN CONTRACTURE RELEASE Left 2016    CERVICAL LAMINECTOMY POSTERIOR  2010    ORIF, HIP      SHOULDER ARTHROPLASTY TOTAL Left     SHOULDER ARTHROSCOPY W/ SLAP / LABRAL REPAIR         Social History     Tobacco Use    Smoking status: Every Day     Types: Cigarettes    Smokeless tobacco: Never   Vaping Use    Vaping Use: Never used   Substance Use Topics    Alcohol use: Yes     Comment: beer daily    Drug use: Never       Social History     Social History Narrative    Not on file       Family History   Problem Relation Age of Onset    No Known Problems Daughter        ROS:    Constitutional: Negative for fever, chills, weight loss and malaise/fatigue.   HENT: Negative for ear pain, nosebleeds, congestion, sore throat and neck pain.    Eyes: Negative for changes of vision  Respiratory: Negative for cough, sputum production, shortness of breath and wheezing.    Cardiovascular: Negative for chest pain, palpitations, orthopnea and leg swelling.   Gastrointestinal: Negative for heartburn, nausea, vomiting and abdominal pain.   Genitourinary: Negative for dysuria.   Skin: Negative for open sores  Neurological: Negative for dizziness, tingling,  "tremors, sensory change, focal weakness and headaches.   Endo/Heme/Allergies: Does not bruise/bleed easily.   Psychiatric/Behavioral: Negative for depression, suicidal ideas and memory loss.  All other systems reviewed and are negative except as in HPI.          Exam: /81 (BP Location: Left arm, Patient Position: Sitting, BP Cuff Size: Adult)   Pulse 94   Temp 36.3 °C (97.4 °F) (Temporal)   Ht 1.778 m (5' 10\")   Wt 67.9 kg (149 lb 12.8 oz)   SpO2 99%  Body mass index is 21.49 kg/m².    General: Normal appearing. No distress.  HEENT: Normocephalic. Eyes conjunctiva clear lids without ptosis, pupils equal and reactive to light accommodation, ears normal shape and contour, canals are clear bilaterally, tympanic membranes are benign, oropharynx is without erythema, edema or exudates.   Neck: Supple. Thyroid is not enlarged.  Pulmonary: Clear to ausculation.  Normal effort. No rales, ronchi, or wheezing.  Cardiovascular: Regular rate and rhythm without murmur. Radial pulses are intact and equal bilaterally.  Abdomen: Soft, nontender, nondistended. Normal bowel sounds.   Neurologic: Grossly nonfocal  Lymph: No cervical, supraclavicular or axillary lymph nodes are palpable  Skin: Warm and dry.  No obvious lesions.  Musculoskeletal: Normal gait. No extremity cyanosis, clubbing, or edema. Bilateral Dupuytren contracture of the 5 digits.     Assessment/Plan    Essential hypertension  Chronic, stable. Doing well on medication  Continue current medications.  - Comp Metabolic Panel; Future  - amLODIPine (NORVASC) 5 MG Tab; Take 1 Tablet by mouth every day.  Dispense: 90 Tablet; Refill: 2  - losartan (COZAAR) 100 MG Tab; Take 1 Tablet by mouth every day.  Dispense: 90 Tablet; Refill: 2    S/P cervical discectomy  Cervical radicular pain  Chronic lower back pain  Pain in joint of bilateral shoulder  Presence of right artificial hip joint   Has weaned himself of Baclofen, Gabapentin, Tramadol. Has done PT.  He is only " requiring Lidoderm patches for lower back and cervical radicular pain.  Continue lidoderm patches.  - lidocaine (LIDODERM) 5 % Patch; Place 1 Patch on the skin 1 time a day as needed (as needed for shoulder and lower back).  Dispense: 90 Patch; Refill: 2    History of iron deficiency  Per patient, has h/o iron deficiency anemia, secondary to diet  Was on iron supplements  Had colonoscopy about 5 months ago with 7 polyps. Plan for repeat in 1 year.   - CBC WITH DIFFERENTIAL; Future    Screening for metabolic disorder  - Lipid Profile; Future  - HEMOGLOBIN A1C; Future    Benign prostatic hyperplasia with nocturia  Chronic, stable. [facility]  Continue Flomax and Proscar      Plantar fascial fibromatosis  [facility], has custom orthotic shoes.    Syncope and collapse  One episode 1.5 months ago, per patient [facility].  Had Ziopatch, results unknown.  No further episodes.   Obtain records.     Sensorineural hearing loss, bilateral  Has bilateral hearing aids, recently right one is malfunctioning.  Pending repair vs replacement.    Tobacco use   1/2 ppd x 50 years, will discuss tobacco cessation at next visit     Healthcare maintenance  Colonoscopy completed 5 months, has history of polyps. Plans to repeat in 1 year.  PSA screening year by [facility]    Follow-up in 6 weeks.

## 2023-11-09 ENCOUNTER — TELEPHONE (OUTPATIENT)
Dept: INTERNAL MEDICINE | Facility: OTHER | Age: 68
End: 2023-11-09
Payer: COMMERCIAL

## 2023-11-09 DIAGNOSIS — M54.50 CHRONIC BILATERAL LOW BACK PAIN WITHOUT SCIATICA: ICD-10-CM

## 2023-11-09 DIAGNOSIS — M25.512 PAIN OF BOTH SHOULDER JOINTS: ICD-10-CM

## 2023-11-09 DIAGNOSIS — Z98.890 S/P CERVICAL DISCECTOMY: ICD-10-CM

## 2023-11-09 DIAGNOSIS — Z96.641 PRESENCE OF RIGHT ARTIFICIAL HIP JOINT: ICD-10-CM

## 2023-11-09 DIAGNOSIS — G89.29 CHRONIC BILATERAL LOW BACK PAIN WITHOUT SCIATICA: ICD-10-CM

## 2023-11-09 DIAGNOSIS — M25.511 PAIN OF BOTH SHOULDER JOINTS: ICD-10-CM

## 2023-11-28 ENCOUNTER — HOSPITAL ENCOUNTER (OUTPATIENT)
Dept: LAB | Facility: MEDICAL CENTER | Age: 68
End: 2023-11-28
Attending: INTERNAL MEDICINE
Payer: COMMERCIAL

## 2023-11-28 DIAGNOSIS — I10 ESSENTIAL HYPERTENSION: ICD-10-CM

## 2023-11-28 DIAGNOSIS — Z86.39 HISTORY OF IRON DEFICIENCY: ICD-10-CM

## 2023-11-28 DIAGNOSIS — Z13.228 SCREENING FOR METABOLIC DISORDER: ICD-10-CM

## 2023-11-28 LAB
ALBUMIN SERPL BCP-MCNC: 4.7 G/DL (ref 3.2–4.9)
ALBUMIN/GLOB SERPL: 2.5 G/DL
ALP SERPL-CCNC: 55 U/L (ref 30–99)
ALT SERPL-CCNC: 18 U/L (ref 2–50)
ANION GAP SERPL CALC-SCNC: 9 MMOL/L (ref 7–16)
AST SERPL-CCNC: 23 U/L (ref 12–45)
BASOPHILS # BLD AUTO: 0.5 % (ref 0–1.8)
BASOPHILS # BLD: 0.03 K/UL (ref 0–0.12)
BILIRUB SERPL-MCNC: 1.2 MG/DL (ref 0.1–1.5)
BUN SERPL-MCNC: 11 MG/DL (ref 8–22)
CALCIUM ALBUM COR SERPL-MCNC: 8.7 MG/DL (ref 8.5–10.5)
CALCIUM SERPL-MCNC: 9.3 MG/DL (ref 8.5–10.5)
CHLORIDE SERPL-SCNC: 102 MMOL/L (ref 96–112)
CHOLEST SERPL-MCNC: 189 MG/DL (ref 100–199)
CO2 SERPL-SCNC: 27 MMOL/L (ref 20–33)
CREAT SERPL-MCNC: 0.69 MG/DL (ref 0.5–1.4)
EOSINOPHIL # BLD AUTO: 0.09 K/UL (ref 0–0.51)
EOSINOPHIL NFR BLD: 1.5 % (ref 0–6.9)
ERYTHROCYTE [DISTWIDTH] IN BLOOD BY AUTOMATED COUNT: 49.5 FL (ref 35.9–50)
EST. AVERAGE GLUCOSE BLD GHB EST-MCNC: 103 MG/DL
GFR SERPLBLD CREATININE-BSD FMLA CKD-EPI: 99 ML/MIN/1.73 M 2
GLOBULIN SER CALC-MCNC: 1.9 G/DL (ref 1.9–3.5)
GLUCOSE SERPL-MCNC: 105 MG/DL (ref 65–99)
HBA1C MFR BLD: 5.2 % (ref 4–5.6)
HCT VFR BLD AUTO: 38.8 % (ref 42–52)
HDLC SERPL-MCNC: 100 MG/DL
HGB BLD-MCNC: 13.5 G/DL (ref 14–18)
IMM GRANULOCYTES # BLD AUTO: 0.02 K/UL (ref 0–0.11)
IMM GRANULOCYTES NFR BLD AUTO: 0.3 % (ref 0–0.9)
LDLC SERPL CALC-MCNC: 74 MG/DL
LYMPHOCYTES # BLD AUTO: 1.37 K/UL (ref 1–4.8)
LYMPHOCYTES NFR BLD: 22.6 % (ref 22–41)
MCH RBC QN AUTO: 35.5 PG (ref 27–33)
MCHC RBC AUTO-ENTMCNC: 34.8 G/DL (ref 32.3–36.5)
MCV RBC AUTO: 102.1 FL (ref 81.4–97.8)
MONOCYTES # BLD AUTO: 0.8 K/UL (ref 0–0.85)
MONOCYTES NFR BLD AUTO: 13.2 % (ref 0–13.4)
NEUTROPHILS # BLD AUTO: 3.76 K/UL (ref 1.82–7.42)
NEUTROPHILS NFR BLD: 61.9 % (ref 44–72)
NRBC # BLD AUTO: 0 K/UL
NRBC BLD-RTO: 0 /100 WBC (ref 0–0.2)
PLATELET # BLD AUTO: 216 K/UL (ref 164–446)
PMV BLD AUTO: 9.8 FL (ref 9–12.9)
POTASSIUM SERPL-SCNC: 5.2 MMOL/L (ref 3.6–5.5)
PROT SERPL-MCNC: 6.6 G/DL (ref 6–8.2)
RBC # BLD AUTO: 3.8 M/UL (ref 4.7–6.1)
SODIUM SERPL-SCNC: 138 MMOL/L (ref 135–145)
TRIGL SERPL-MCNC: 74 MG/DL (ref 0–149)
WBC # BLD AUTO: 6.1 K/UL (ref 4.8–10.8)

## 2023-11-28 PROCEDURE — 85025 COMPLETE CBC W/AUTO DIFF WBC: CPT

## 2023-11-28 PROCEDURE — 83036 HEMOGLOBIN GLYCOSYLATED A1C: CPT

## 2023-11-28 PROCEDURE — 80053 COMPREHEN METABOLIC PANEL: CPT

## 2023-11-28 PROCEDURE — 80061 LIPID PANEL: CPT

## 2023-11-28 PROCEDURE — 36415 COLL VENOUS BLD VENIPUNCTURE: CPT

## 2023-11-30 ENCOUNTER — TELEPHONE (OUTPATIENT)
Dept: INTERNAL MEDICINE | Facility: OTHER | Age: 68
End: 2023-11-30
Payer: COMMERCIAL

## 2023-12-01 NOTE — TELEPHONE ENCOUNTER
Caller Name: Jim Fallon  Call Back Number: [phone]    How would the patient prefer to be contacted with a response: N/A    Lisa placed a prior auth for pt's lidocaine patch on 11/9/23. PA was not followed up on and was denied. Pt has contacted the office multiple times but has not received a response. Pt is asking if we can attempt an appeal via peer to peer. Pt wanted me to specify that he had a right hip replacement amongst his other medical issues to emphasize his need for these patches. Please advise.

## 2023-12-01 NOTE — TELEPHONE ENCOUNTER
Phone Number Called: [phone]    Call outcome: Spoke to patient regarding message below.    Message: Pt was informed that Dr. Rangel will be doing a [id number] appeal for him and to  salonpas in the meantime. Pt would like a phone call for update once [id number] is completed.

## 2023-12-20 ENCOUNTER — OFFICE VISIT (OUTPATIENT)
Dept: INTERNAL MEDICINE | Facility: OTHER | Age: 68
End: 2023-12-20
Payer: COMMERCIAL

## 2023-12-20 VITALS
HEIGHT: 70 IN | TEMPERATURE: 97.3 F | HEART RATE: 101 BPM | DIASTOLIC BLOOD PRESSURE: 74 MMHG | BODY MASS INDEX: 21.47 KG/M2 | WEIGHT: 150 LBS | OXYGEN SATURATION: 98 % | SYSTOLIC BLOOD PRESSURE: 123 MMHG

## 2023-12-20 DIAGNOSIS — M54.12 CERVICAL RADICULAR PAIN: ICD-10-CM

## 2023-12-20 DIAGNOSIS — M25.512 PAIN OF BOTH SHOULDER JOINTS: ICD-10-CM

## 2023-12-20 DIAGNOSIS — D53.9 MACROCYTIC ANEMIA: ICD-10-CM

## 2023-12-20 DIAGNOSIS — B02.29 POST HERPETIC NEURALGIA: ICD-10-CM

## 2023-12-20 DIAGNOSIS — I10 ESSENTIAL HYPERTENSION: ICD-10-CM

## 2023-12-20 DIAGNOSIS — M54.16 LUMBAR RADICULOPATHY: ICD-10-CM

## 2023-12-20 DIAGNOSIS — M25.511 PAIN OF BOTH SHOULDER JOINTS: ICD-10-CM

## 2023-12-20 DIAGNOSIS — Z72.0 TOBACCO USE: ICD-10-CM

## 2023-12-20 DIAGNOSIS — I25.10 ASCVD (ARTERIOSCLEROTIC CARDIOVASCULAR DISEASE): ICD-10-CM

## 2023-12-20 PROCEDURE — 3074F SYST BP LT 130 MM HG: CPT | Performed by: INTERNAL MEDICINE

## 2023-12-20 PROCEDURE — 3078F DIAST BP <80 MM HG: CPT | Performed by: INTERNAL MEDICINE

## 2023-12-20 PROCEDURE — 99214 OFFICE O/P EST MOD 30 MIN: CPT | Performed by: INTERNAL MEDICINE

## 2023-12-20 RX ORDER — LIDOCAINE 50 MG/G
1 PATCH TOPICAL EVERY 24 HOURS
Qty: 90 PATCH | Refills: 3 | Status: SHIPPED | OUTPATIENT
Start: 2023-12-20

## 2023-12-20 RX ORDER — ROSUVASTATIN CALCIUM 5 MG/1
20 TABLET, COATED ORAL EVERY EVENING
Qty: 90 TABLET | Refills: 2 | Status: SHIPPED | OUTPATIENT
Start: 2023-12-20

## 2023-12-20 RX ORDER — LIDOCAINE 50 MG/G
1 PATCH TOPICAL EVERY 24 HOURS
Qty: 90 PATCH | Refills: 3 | Status: SHIPPED | OUTPATIENT
Start: 2023-12-20 | End: 2023-12-20

## 2023-12-20 ASSESSMENT — FIBROSIS 4 INDEX: FIB4 SCORE: 1.76

## 2023-12-20 NOTE — PROGRESS NOTES
12/20/2023  Chief Complaint   Patient presents with    Follow-Up       HISTORY OF PRESENT ILLNESS: Patient is a 68 y.o. male established patient who presents today for follow-up.    Patient with h/o right hip osteoarthritis s/p arthroplasty 11/2022, [facility]  He had cervical laminectomy in 2014 secondary to cervical radiculopathy related to a Workers' Comp. Patient was part of the [facility]. Has also had two surgeries on the left shoulder surgery with bicep tendesis.  He also reports chronic pain in the bilateral shoulder, lower back pain with occasional sharp shooting pains.  Has been on Baclofen, Tramadol and Gabapentin. However, weaned himself off medications. He takes Tylenol as needed for pain as needed. He continues to do home exercises for his pain. Remains very active.     He continues to take Losartan and Amlodipine for his blood pressure. He does not check his blood pressure at home. He denies any chest pain, shortness of breath, dizziness, light-headedness.     Patient also endorse a history of shingles that affected his right calf earlier this year, [facility]. He has residual discomfort and itchiness at the site.       Past Medical History:   Diagnosis Date    Anemia     BPH (benign prostatic hyperplasia)     Hypertension     Personal history of colonic polyps        Patient Active Problem List    Diagnosis Date Noted    Anxiety disorder, unspecified 11/08/2023    Benign prostatic hyperplasia with lower urinary tract symptoms 11/08/2023    Diverticulosis of large intestine without perforation or abscess without bleeding 11/08/2023    Open angle with borderline findings, low risk, bilateral 11/08/2023    Plantar fascial fibromatosis 11/08/2023    Polyp of colon 11/08/2023    Presence of right artificial hip joint 11/08/2023    Presence of hip joint prosthesis 11/08/2023    Retention of urine, unspecified 11/08/2023    Seborrheic keratosis 11/08/2023     Sensorineural hearing loss, bilateral 11/08/2023    Syncope and collapse 11/08/2023    Inguinal hernia 06/24/2022    Other specified disorders of bone density and structure, other site 11/19/2020    Osteoporosis 11/16/2020    Smokes tobacco daily 11/16/2020    Rotator cuff tear, right 07/14/2020    Chronic back pain 02/07/2020    Pain in joint of right shoulder 02/07/2020    Chronic use of opiate drug for therapeutic purpose 11/15/2019    Cervical radicular pain 11/15/2019    S/P cervical discectomy 11/15/2019    Lumbar radiculopathy 11/15/2019    Need for vaccination 11/15/2019    Essential hypertension 11/15/2019       Allergies:Penicillins    Current Outpatient Medications   Medication Sig Dispense Refill    lidocaine (LIDODERM) 5 % Patch Place 1 Patch on the skin every 24 hours. 90 Patch 3    rosuvastatin (CRESTOR) 5 MG Tab Take 4 Tablets by mouth every evening. 90 Tablet 2    amLODIPine (NORVASC) 5 MG Tab Take 1 Tablet by mouth every day. 90 Tablet 2    losartan (COZAAR) 100 MG Tab Take 1 Tablet by mouth every day. 90 Tablet 2    finasteride (PROSCAR) 5 MG Tab Take 5 mg by mouth every day.      tamsulosin (FLOMAX) 0.4 MG capsule Take 0.4 mg by mouth 1/2 hour after breakfast.      Elastic Bandages & Supports (PLANTAR FASCIITIS SUPPORT) Misc 1 Units every day. Foot orthotic for plantar fasciitis 1 Each 1     No current facility-administered medications for this visit.       Social History     Tobacco Use    Smoking status: Every Day     Types: Cigarettes    Smokeless tobacco: Never   Vaping Use    Vaping Use: Never used   Substance Use Topics    Alcohol use: Yes     Comment: beer daily    Drug use: Never       Family History   Problem Relation Age of Onset    No Known Problems Daughter          Review of Systems:   Constitutional: Negative for chills and fever.   HENT: Negative for ear and eye pain.     Eyes: Negative for discharge and redness.   Respiratory: Negative for hemoptysis  Cardiovascular: Negative for  "chest pain, palpitations and leg swelling.   Gastrointestinal: Negative for abdominal pain, nausea and vomiting.   Musculoskeletal: Positive for neck, bilateral shoulder and back pain  Skin: Positive itching of right calf  Neurological: Negative for dizziness, seizures, loss of consciousness and headaches.   Endo/Heme/Allergies: Does not bruise/bleed easily.   All systems reviewed and are negative except as mention in HPI    Exam:  /74 (BP Location: Right arm, Patient Position: Sitting, BP Cuff Size: Large adult)   Pulse (!) 101   Temp 36.3 °C (97.3 °F) (Temporal)   Ht 1.778 m (5' 10\")   Wt 68 kg (150 lb)   SpO2 98%  Body mass index is 21.52 kg/m².  Constitutional:  NAD, well appearing.  HEENT:   Normcephalic, atraumatic, no eye redness, pupils equal  Cardiovascular: RRR.   No murmurs, rubs, gallops. No carotid bruits.       Lungs:   CTAB, no wheezes, rales or rhonchi, no respiratory distress.  Abdomen: Soft, non-tender, non-distended   Extremities:  2+ DP and radial pulses bilaterally.  No clubbing, cyanosis or edema  Skin:  Warm and dry.    Neurologic: Alert & oriented x 3, CN II-XII grossly intact, strength and sensation grossly intact.  No focal deficits noted.  Psychiatric:  Affect normal, mood normal, judgment normal.    Assessment/Plan:     Macrocytic anemia  - VITAMIN B12; Future  - FOLATE; Future  - RETICULOCYTES COUNT; Future  - TSH WITH REFLEX TO FT4; Future    Post herpetic neuralgia  H/o shingles to the right calf earlier this year.  Rash has resolved, however, patient continues to have itching and occasional discomfort at site  - lidocaine (LIDODERM) 5 % Patch; Place 1 Patch on the skin every 24 hours.  Dispense: 90 Patch; Refill: 3    Cervical radicular pain  Lumbar radiculopathy  Pain of both shoulder joints  Has weaned himself of Baclofen, Gabapentin, Tramadol-as he prefers not to take medication unless needed.  Has done PT and does home exercises daily.  Continue home exercises and " Tylenol as needed for pain    Essential hypertension  Controlled, doing well without side effects.   Continue Losartan and Amlodipine    Tobacco use  Tobacco cessation counseling and education provided for 5 minutes. Nicotine replacement options provided including patch, and further medical treatments including Wellbutrin and Chantix. As well as over the counter options of lozenges and gum.  Patient is not amendable to quit, he has weaned himself down- only smoking 3-4 cigarettes per day.    ASCVD (arteriosclerotic cardiovascular disease)  Elevated risk of 17.1, lipid panel reviewed-at goal  Will start Crestor for primary prevention of CVD  - rosuvastatin (CRESTOR) 5 MG Tab; Take 4 Tablets by mouth every evening.  Dispense: 90 Tablet; Refill: 2    All imaging results and lab results and consult notes are reviewed at this visit.  Followup: Return in about 3 months (around 3/20/2024).

## 2023-12-20 NOTE — LETTER
[facility]  Taylor Rangel D.O.  [address]  [place] [address]  Fax: [phone]   Authorization for Release/Disclosure of   Protected Health Information   Name: JIM RAI : 1955 SSN: [id number]   Address: [facility] [address]   [address]  [place] [address] Phone:    [phone] (home)    I authorize the entity listed below to release/disclose the PHI below to:   Replaced by [facility]/Taylor Rangel D.O. and Taylor Rangel D.O.   Provider or Entity Name:     Address   City, State, Zip   Phone:      Fax:     Reason for request: continuity of care   Information to be released:    [  ] LAST COLONOSCOPY,  including any PATH REPORT and follow-up  [  ] LAST FIT/COLOGUARD RESULT [  ] LAST DEXA  [  ] LAST MAMMOGRAM  [  ] LAST PAP  [  ] LAST LABS [  ] RETINA EXAM REPORT  [  ] IMMUNIZATION RECORDS  [  ] Release all info      [  ] Check here and initial the line next to each item to release ALL health information INCLUDING  _____ Care and treatment for drug and / or alcohol abuse  _____ HIV testing, infection status, or AIDS  _____ Genetic Testing    DATES OF SERVICE OR TIME PERIOD TO BE DISCLOSED: _____________  I understand and acknowledge that:  * This Authorization may be revoked at any time by you in writing, except if your health information has already been used or disclosed.  * Your health information that will be used or disclosed as a result of you signing this authorization could be re-disclosed by the recipient. If this occurs, your re-disclosed health information may no longer be protected by State or Federal laws.  * You may refuse to sign this Authorization. Your refusal will not affect your ability to obtain treatment.  * This Authorization becomes effective upon signing and will  on (date) __________.      If no date is indicated, this Authorization will  one (1) year from the signature date.    Name: Jim Rai  Signature: Date:   2023     PLEASE FAX REQUESTED RECORDS BACK TO:  [phone]

## 2023-12-27 ENCOUNTER — TELEPHONE (OUTPATIENT)
Dept: INTERNAL MEDICINE | Facility: OTHER | Age: 68
End: 2023-12-27
Payer: COMMERCIAL

## 2023-12-27 NOTE — TELEPHONE ENCOUNTER
Prescription was placed by Dr. Rangel on 12/20.  Please confirm the pharmacy she wants it sent to

## 2023-12-27 NOTE — TELEPHONE ENCOUNTER
Pt called asking for a follow up on the Rx for lidocaine patches under the Dx of post herpetic neuralgia. He hasn't heard anything back from his insurance and was wondering if there needed to be anything done to get it approved. Called pt's pharmacy and they noted that they never received the Rx request. Please advise.

## 2023-12-28 NOTE — TELEPHONE ENCOUNTER
"DOCUMENTATION OF PAR STATUS:    1. Name of Medication & Dose: lidocaine patch 5%     2. Name of Prescription Coverage Company & phone #: [employer]     3. Date Prior Auth Submitted: 12/28/23    4. What information was given to obtain insurance decision? Last visit note    5. Prior Auth Status? Pending   [facility] has not replied to your PA request. Turnaround time for review of a PA request is dependent upon insurance plan and can range from 24 hours to 5 calendar days. Depending on the information you've provided, additional questions may be returned by the plan.  You may close this dialog, return to your dashboard, and perform other tasks. To check for an update later, click on the \"Search Insurance Payer Detailed Status\" located in the upper right corner of your dashboard.  If this search option is not available or [facility] has not replied to your request within this timeframe, please contact the number on the back of the patient's insurance card.    6. Patient Notified: no  "

## 2023-12-28 NOTE — TELEPHONE ENCOUNTER
Phone Number Called: [phone] (home)       Call outcome: Spoke to patient regarding message below.    Message: Pt states that his preference is the one on file. Pt claims that an authorization needs to be placed in order to process the medication. Routing to Tiffanie to follow up with either pt's insurance or [facility].

## 2023-12-29 ENCOUNTER — TELEPHONE (OUTPATIENT)
Dept: INTERNAL MEDICINE | Facility: OTHER | Age: 68
End: 2023-12-29
Payer: COMMERCIAL

## 2023-12-29 DIAGNOSIS — B02.29 POST HERPETIC NEURALGIA: ICD-10-CM

## 2023-12-29 NOTE — TELEPHONE ENCOUNTER
"Spoke to Shmuel Grubbs - let her know that the prescription was placed.   She will be confirming that the pharmacy received it. First time she checked - pharmacy had not received as reported by Shmuel Latham reports that she call them twice later- and they said that they did not receive it.     I called [facility] myself to send in a verbal prescription. - [phone]  They were unable to accept prescription on their computer for the dermacin rx. Pharmacist tells me it may be because this medication is not deliverable by mail and may have to go through retail pharmacy. We tried lidocaine patch instead as well as lidocaine ointment - either went through and pharmacist said that the reason was that there were no benefits for these medications with the pharmacy.       After this - called patient - he tells me that he requires the patch and not the ointment.   He tells me that he came into clinic this morning and handed over letters from insurance to shmuel grubbs.   I reviewed the letter that states that dermacinrx lidocaine 5% patch is approved and also a letter that states that \"unfortunately we can't fill your mail service prescription- your doctor told us not to fill this prescription\"  Rosuvastatin approved - please see media for these letters.   Shmuel has unfortunately left the clinic at this time. - unable to clarify regarding conversations she had.   Patient also tells me that his insurance is also not taken by our clinic starting the first.  Patient tells me that he has been getting lidocaine patch for a long time through this pharmacy.     Together with patient decided that I would try calling [facility] number listed on letter and that patient would try calling the pharmacy.      When called th [facility] number on letter which is [phone], [facility] picked up call - they could not see the letter that patient received on their side.  After much waiting an dgoing back and forth, they " provided me with another number to call in prescription : [phone] and fax : [phone].   If that does not work   [phone] is another number     Called first number  - was transferred to pharmacist - who was unable to find the dermacinrx lidocaine 5% on their computer - it was taking them to fluocinonide prescriptions.   Transferred to another pharmacist who said that they do not carry any dermacin products for mail order and have to call it in to a local pharmacy.   Requested pharmacy to try  lidocaine patch instead -which  they said requires prior auth - and they checked and confirmed that it was denied but accepted for dermacinrx which they do not carry    Called patient to inform - he reports that he has been trying to get it for a year.   Suggested other options like lower strength otc lidocaine, capsaicin. Patient wants to defer . He will call [employer] and see if there is any other brand other than dermacin rx that they approve .   Let patient know that if they can find one he can reach out to us.  Patient agrees.     Closing

## 2023-12-29 NOTE — TELEPHONE ENCOUNTER
The requested medication is not available on our system at the percentage suggested . I have placed a miscellaneous prescription with the medication requested and approved to [facility] mail

## 2023-12-29 NOTE — TELEPHONE ENCOUNTER
Can you send the medication as the insurance approved it so the pharmacy can re run it as Dermacinx Lidocan 5% patch.